# Patient Record
Sex: MALE | Race: WHITE | NOT HISPANIC OR LATINO | Employment: OTHER | URBAN - METROPOLITAN AREA
[De-identification: names, ages, dates, MRNs, and addresses within clinical notes are randomized per-mention and may not be internally consistent; named-entity substitution may affect disease eponyms.]

---

## 2022-01-01 ENCOUNTER — VIRTUAL VISIT (OUTPATIENT)
Dept: ONCOLOGY | Facility: CLINIC | Age: 77
End: 2022-01-01
Attending: STUDENT IN AN ORGANIZED HEALTH CARE EDUCATION/TRAINING PROGRAM
Payer: COMMERCIAL

## 2022-01-01 ENCOUNTER — PRE VISIT (OUTPATIENT)
Dept: ONCOLOGY | Facility: CLINIC | Age: 77
End: 2022-01-01

## 2022-01-01 ENCOUNTER — LAB REQUISITION (OUTPATIENT)
Dept: LAB | Facility: CLINIC | Age: 77
End: 2022-01-01
Payer: COMMERCIAL

## 2022-01-01 ENCOUNTER — PATIENT OUTREACH (OUTPATIENT)
Dept: ONCOLOGY | Facility: CLINIC | Age: 77
End: 2022-01-01

## 2022-01-01 DIAGNOSIS — Z19.2 HORMONE RESISTANT PROSTATE CANCER (H): ICD-10-CM

## 2022-01-01 DIAGNOSIS — C61 PROSTATE CANCER (H): ICD-10-CM

## 2022-01-01 DIAGNOSIS — C61 HORMONE RESISTANT PROSTATE CANCER (H): ICD-10-CM

## 2022-01-01 DIAGNOSIS — T45.1X5A CHEMOTHERAPY INDUCED NEUTROPENIA (H): Primary | ICD-10-CM

## 2022-01-01 DIAGNOSIS — D70.1 CHEMOTHERAPY INDUCED NEUTROPENIA (H): Primary | ICD-10-CM

## 2022-01-01 LAB
PATH REPORT.COMMENTS IMP SPEC: ABNORMAL
PATH REPORT.COMMENTS IMP SPEC: ABNORMAL
PATH REPORT.COMMENTS IMP SPEC: YES
PATH REPORT.FINAL DX SPEC: ABNORMAL
PATH REPORT.GROSS SPEC: ABNORMAL
PATH REPORT.MICROSCOPIC SPEC OTHER STN: ABNORMAL
PATH REPORT.RELEVANT HX SPEC: ABNORMAL
PATH REPORT.RELEVANT HX SPEC: ABNORMAL
PATH REPORT.SITE OF ORIGIN SPEC: ABNORMAL

## 2022-01-01 PROCEDURE — 88321 CONSLTJ&REPRT SLD PREP ELSWR: CPT | Performed by: PATHOLOGY

## 2022-01-01 PROCEDURE — 99205 OFFICE O/P NEW HI 60 MIN: CPT | Mod: 95 | Performed by: STUDENT IN AN ORGANIZED HEALTH CARE EDUCATION/TRAINING PROGRAM

## 2022-01-01 PROCEDURE — 99417 PROLNG OP E/M EACH 15 MIN: CPT | Mod: 95 | Performed by: STUDENT IN AN ORGANIZED HEALTH CARE EDUCATION/TRAINING PROGRAM

## 2022-01-01 RX ORDER — PROCHLORPERAZINE MALEATE 5 MG
5 TABLET ORAL EVERY 6 HOURS PRN
Status: CANCELLED
Start: 2023-01-01

## 2022-01-01 RX ORDER — AMLODIPINE BESYLATE AND ATORVASTATIN CALCIUM 5; 10 MG/1; MG/1
TABLET, FILM COATED ORAL
COMMUNITY

## 2022-01-01 RX ORDER — ALBUTEROL SULFATE 90 UG/1
1-2 AEROSOL, METERED RESPIRATORY (INHALATION)
Status: CANCELLED
Start: 2023-01-01

## 2022-01-01 RX ORDER — AMLODIPINE BESYLATE 5 MG/1
1 TABLET ORAL DAILY
COMMUNITY
Start: 2022-10-11

## 2022-01-01 RX ORDER — NITROGLYCERIN 0.4 MG/1
TABLET SUBLINGUAL
COMMUNITY
Start: 2022-10-11

## 2022-01-01 RX ORDER — ONDANSETRON 8 MG/1
8 TABLET, FILM COATED ORAL
Status: CANCELLED | OUTPATIENT
Start: 2023-01-01

## 2022-01-01 RX ORDER — LORAZEPAM 2 MG/ML
.5-1 INJECTION INTRAMUSCULAR EVERY 6 HOURS PRN
Status: CANCELLED | OUTPATIENT
Start: 2023-01-01

## 2022-01-01 RX ORDER — METHYLPREDNISOLONE SODIUM SUCCINATE 125 MG/2ML
125 INJECTION, POWDER, LYOPHILIZED, FOR SOLUTION INTRAMUSCULAR; INTRAVENOUS
Status: CANCELLED
Start: 2023-01-01

## 2022-01-01 RX ORDER — MEPERIDINE HYDROCHLORIDE 25 MG/ML
25 INJECTION INTRAMUSCULAR; INTRAVENOUS; SUBCUTANEOUS EVERY 30 MIN PRN
Status: CANCELLED | OUTPATIENT
Start: 2023-01-01

## 2022-01-01 RX ORDER — ATORVASTATIN CALCIUM 80 MG/1
1 TABLET, FILM COATED ORAL DAILY
COMMUNITY
Start: 2022-10-11

## 2022-01-01 RX ORDER — PREDNISONE 10 MG/1
10 TABLET ORAL
COMMUNITY
Start: 2022-01-01 | End: 2022-01-01

## 2022-01-01 RX ORDER — LORAZEPAM 0.5 MG/1
.5-1 TABLET ORAL EVERY 6 HOURS PRN
Status: CANCELLED
Start: 2023-01-01

## 2022-01-01 RX ORDER — ALBUTEROL SULFATE 0.83 MG/ML
2.5 SOLUTION RESPIRATORY (INHALATION)
Status: CANCELLED | OUTPATIENT
Start: 2023-01-01

## 2022-01-01 RX ORDER — EPINEPHRINE 1 MG/ML
0.3 INJECTION, SOLUTION INTRAMUSCULAR; SUBCUTANEOUS EVERY 5 MIN PRN
Status: CANCELLED | OUTPATIENT
Start: 2023-01-01

## 2022-01-01 RX ORDER — DIPHENHYDRAMINE HYDROCHLORIDE 50 MG/ML
50 INJECTION INTRAMUSCULAR; INTRAVENOUS
Status: CANCELLED
Start: 2023-01-01

## 2022-09-30 ENCOUNTER — TRANSCRIBE ORDERS (OUTPATIENT)
Dept: ONCOLOGY | Facility: CLINIC | Age: 77
End: 2022-09-30

## 2022-09-30 ENCOUNTER — PATIENT OUTREACH (OUTPATIENT)
Dept: ONCOLOGY | Facility: CLINIC | Age: 77
End: 2022-09-30

## 2022-09-30 DIAGNOSIS — C61 PROSTATE CANCER (H): Primary | ICD-10-CM

## 2022-09-30 NOTE — PROGRESS NOTES
New Patient Oncology Nurse Navigator Note     Referring provider:   Stevo Clarke MD, Oncologist  [facility] [place], [place]       Referred to (specialty): Medical Oncology    Requested provider (if applicable): Dr. Aggarwal     Date Referral Received: 9/30/22     Evaluation for : Metastatic castrate resistant prostate cancer- discussion of PSMA Lutetium/Pluvicto- See Dr. Aggarwal     Clinical History (per Nurse review of records provided):      Consideration of whether he may be a candidate for PSMA lutetium.  Mr. Davis has been through a number of treatments in the castrate resistant setting including abiraterone and prednisone, olaparib (he did have a CDK12 mutation on next generation sequencing), docetaxel, and now cabazitaxel. Follow of his disease predominantly via CT scanning for which he has had mediastinal adenopathy and pleural-based disease.  A biopsy of a pleural-based mass in 2020 did reveal metastatic prostate adenocarcinoma.  All of his clinical information is in epic and available via care everywhere.  His functional status is very good and remains that of ECOG 0-1. Not yet performed a PSMA PET scan.        Records Location (Care Everywhere, [place], etc.):   [facility]-       Records Needed:   [facility]- Imaging/pathology     Additional testing needed prior to consult: PSMA PET to be done locally prior to consult    I contacted Ravinder to discuss referral to Dr. Aggarwal for discussion of Pluvicto.  Ravinder would like to have his PSMA PET done locally prior to consult with Dr. Aggarwal.  Referral was initiated via email from Dr. Clarke to Dr. Aggarwal.  I have requested that Dr. Aggarwal ask Dr. Clarke, to order PET.  I have given Ravinder my phone number to call, once the PET is scheduled, and we will coordinate when he should see Dr. Aggarwal.  I will have our medical records team obtain imaging from [facility].  I will await call back from Ravinder to proceed.    10/18/22  I called  Ravinder today to check if he had set up for the PSMA PET.  He indicates it is tomorrow, but at this time he would like to first talk with Dr. Clarke regarding the results and call me if he wished to pursue.  I gave him my number again so he has it for certain.  I will await his call back and update Dr. Clarke and Dr. Aggarwal, as this originated from a referral by email from Dr. Clarke.

## 2022-10-03 ENCOUNTER — PRE VISIT (OUTPATIENT)
Dept: ONCOLOGY | Facility: CLINIC | Age: 77
End: 2022-10-03

## 2022-10-03 NOTE — TELEPHONE ENCOUNTER
Action October 5, 2022 10:39 AM ABT   Action Taken Images from [facility] received and resolved to PAC     RECORDS STATUS - ALL OTHER DIAGNOSIS      RECORDS RECEIVED FROM: [facility]   DATE RECEIVED:    NOTES STATUS DETAILS   OFFICE NOTE from referring provider [facility] Dr. Timothy Clarke   OFFICE NOTE from medical oncologist [facility] Dr. Clarke: 9/15/22   DISCHARGE SUMMARY from hospital [facility] 8/8/01   DISCHARGE REPORT from the ER     OPERATIVE REPORT [facility] 8/8/01: Radical retropubic prostatectomy with bilateral pelvic lymphadenectomy     MEDICATION LIST [facility]   LABS     PATHOLOGY REPORTS [facility], Reports in  9/17/20: [id number]  8/8/01: [id number]  6/8/01: [id number]   ANYTHING RELATED TO DIAGNOSIS Epic/  9/15/22   GENONOMIC TESTING     TYPE: [facility] 9/17/20: PDL-1   IMAGING (NEED IMAGES & REPORT)     CT SCANS PACS 7/28/22, 3/8/22, 1/3/22, 10/19/21, 8/27/20, 10/4/19, 4/5/19, 5/22/09: CT CAP   NM Bone Scan PACS 10/4/19, 4/5/19, 5/22/09   ULTRASOUND PACS 10/29/21: [facility]   PET PACS 11/15/19: [facility] (Report in [facility])

## 2022-11-18 NOTE — PROGRESS NOTES
I called in attempt to reach out to Kingston to discuss referral to see Dr. Aggarwal, for discussion of Pluvicto treatment.  There was no answer so I left message for him to return my call.      It appears per visit with Dr. Clarke, on 11/17/22, he would still like Kingston to move forward with consult.  I have a current hold on an appointment with Dr. Aggarwal, on December 12, 2:15 pm arrival, at the [facility].  I will await call back to discuss.

## 2022-11-21 NOTE — PROGRESS NOTES
COPIED FROM PREVIOUS NOTES:  9/30/22    New Patient Oncology Nurse Navigator Note     Referring provider:   Stevo Clarke MD, Oncologist  [place], [place]        Referred to (specialty): Medical Oncology     Requested provider (if applicable): Dr. Aggarwal     Date Referral Received: 9/30/22     Evaluation for : Metastatic castrate resistant prostate cancer- discussion of PSMA Lutetium/Pluvicto- See Dr. Aggarwal     Clinical History (per Nurse review of records provided):       Consideration of whether he may be a candidate for PSMA lutetium.  Mr. Davis has been through a number of treatments in the castrate resistant setting including abiraterone and prednisone, olaparib (he did have a CDK12 mutation on next generation sequencing), docetaxel, and now cabazitaxel. Follow of his disease predominantly via CT scanning for which he has had mediastinal adenopathy and pleural-based disease.  A biopsy of a pleural-based mass in 2020 did reveal metastatic prostate adenocarcinoma.  All of his clinical information is in epic and available via care everywhere.  His functional status is very good and remains that of ECOG 0-1. Not yet performed a PSMA PET scan.         Records Location (Care Everywhere, Media, etc.):   [facility]-       Records Needed:   [facility]- Imaging/pathology     Additional testing needed prior to consult: PSMA PET to be done locally prior to consult     I contacted Ravinder to discuss referral to Dr. Aggarwal for discussion of Pluvicto.  Ravinder would like to have his PSMA PET done locally prior to consult with Dr. Aggarwal.  Referral was initiated via email from Dr. Clarke to Dr. Aggarwal.  I have requested that Dr. Aggarwal ask Dr. Clarke, to order PET.  I have given Ravinder my phone number to call, once the PET is scheduled, and we will coordinate when he should see Dr. Aggarwal.  I will have our medical records team obtain imaging from [facility].  I will await call back  from Ravinder to carly.     10/18/22  I called Ravinder today to check if he had set up for the PSMA PET.  He indicates it is tomorrow, but at this time he would like to first talk with Dr. Clarke regarding the results and call me if he wished to pursue.  I gave him my number again so he has it for certain.  I will await his call back and update Dr. Clarke and Dr. Aggarwal, as this originated from a referral by email from Dr. Clarke.                  11/18/22  I called in attempt to reach out to Kingston to discuss referral to see Dr. Aggarwal, for discussion of Pluvicto treatment.  There was no answer so I left message for him to return my call.       It appears per visit with Dr. Clarke, on 11/17/22, he would still like Kingston to move forward with consult.  I have a current hold on an appointment with Dr. Aggarwal, on December 12, 2:15 pm arrival, at the [facility].  I will await call back to discuss.    11/21/22  Kingston received my message and left me one to call him back.  I called to discuss the above with Kingston.  He indicates he has another appointment on 12/12, but is able to do 12/26/22.  I reviewed with him what to expect at this visit.  I gave him the location and contact phone numbers.  I sent referral with scheduling instructions for 12/26/22, @ [facility], with Dr. Aggarwal, [id number] PM, in person.  He has no other questions at this time.  I transferred call to our new patient scheduling to finalize.    11/21/22 1540  Dr. Aggarwal is able to open up a spot for [place], on 12/13/22.  Plan that he will arrive at 1:45 PM.  I discussed this with Kingston and will have our new patient scheduling make this change.  He has no other needs at this time and thanked me for the call.

## 2022-11-30 NOTE — TELEPHONE ENCOUNTER
Action 2022 8:58 AM ABT   Action Taken Slides from [facility] received and taken to 5th floor path lab for review.       RECORDS STATUS - ALL OTHER DIAGNOSIS      RECORDS RECEIVED FROM: [facility]   DATE RECEIVED:    NOTES STATUS DETAILS   OFFICE NOTE from referring provider [facility] 22: Dr. Stevo Clarke   OFFICE NOTE from medical oncologist [facility] 22: Dr. Stevo Clarke   DISCHARGE SUMMARY from hospital [facility] 01: [facility]   OPERATIVE REPORT [facility] 01: Radical retropubic prostatectomy with bilateral pelvic lymphadenectomy   MEDICATION LIST [facility]    LABS     PATHOLOGY REPORTS Req -[facility]  [employer] F[facility]:  20: [id number]    Reports only:  01: [id number]  01: [id number]   ANYTHING RELATED TO DIAGNOSIS [facility] Most recent 22   GENONOMIC TESTING     TYPE: [facility] ([facility]) 20: [id number]   IMAGING (NEED IMAGES & REPORT)     CT SCANS PACS [facility]:  22: CT Urogram  10/11/22-21: CT Chest Abd Pel    PACS;  22-09   NM PACS 10/04/19-09: NM Bone Scan   PET PACS [facility]:   10/19/22: PET CT Skull

## 2022-12-13 PROBLEM — C61 PROSTATE CANCER (H): Status: ACTIVE | Noted: 2022-01-01

## 2022-12-13 PROBLEM — C61 HORMONE RESISTANT PROSTATE CANCER (H): Status: ACTIVE | Noted: 2022-01-01

## 2022-12-13 PROBLEM — D70.1 CHEMOTHERAPY INDUCED NEUTROPENIA (H): Status: ACTIVE | Noted: 2022-01-01

## 2022-12-13 PROBLEM — T45.1X5A CHEMOTHERAPY INDUCED NEUTROPENIA (H): Status: ACTIVE | Noted: 2022-01-01

## 2022-12-13 PROBLEM — Z19.2 HORMONE RESISTANT PROSTATE CANCER (H): Status: ACTIVE | Noted: 2022-01-01

## 2022-12-13 NOTE — PROGRESS NOTES
[facility] Medical Oncology Second Opinion Consultation Note       Date of visit: December 13, 2022      Dear Dr. Timothy Clarke, thank you for referring your patient for a Second Opinion consultation at the [facility].  A brief overview of his oncological history as well as my recommendations follow below.    CC: Consideration for Pluvicto therapy for mCRPC    HPI: Ravinder presents by video visit today to discuss the possibility of Pluvicto therapy for his metastatic castration resistant prostate cancer.  He is overall feeling well.  He denies significant pain.  He is able to chop wood with a log splitter and his chainsaw and he is able to clear snow without significant limitations.  Overall he is doing well.  His appetite is been good he does feel that cabazitaxel has somewhat limited his activity in the immediate days after infusion.  He usually recovers within 4 to 5 days after each cabazitaxel infusion.  He has no shortness of breath he reports.  He otherwise feels he is doing quite well given how long he has been treated for prostate cancer.  He denies additional complaints or concerns at the end of our appointment.    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    5/14/2001-elevated PSA to 40.   6/8/2001-transrectal biopsy.  [place] 4+3=7 bilaterally in 7/7 cores.  Age 55.   8/8/2001-Radical prostatectomy.  Bill 4+3=7 with tertiary 5.  Right SV involved with EPE.   10/2001-PSA 3.8  1/2002-PSA 5.7  1/8/2002-Start ADT x1 year and salvage XRT.    12/2002-11/2004-PSA [place] 0.01  5/2009-PSA 0.83.  Staging CT CAP and NM bone scan negative.    11/2009-PSA 4.6  3/2010-PSA 9.6  4/20102078-9363-MYD PSA elmer 0.05  2/2013-PSA 0.85  4/2013-PSA 2.6  6/2013-9.4  6/2013-Restart ADT, PSA elmer 0.16  5/27/2015-Addition of casodex through summer 2016  9/22/16-PSA 5.2 with doubling time of <3 months  10/7/2016-start  enzalutamide.  Elmer 0.62 on 4/16/18.   12/20/2019-Stop enzalutamide.  PSA 24.89  12/27/2019-Start abiraterone/prednisone.   8/27/20-PSA 39.01.  No response to abiraterone.  9/17/20-FNA of R pleural mass consistent with metastatic, poorly differentiated prostate adenocarcinoma.   10/2020-Stop abiraterone. Start olaparib.   9/7/2021-.24.  No response to olaparib.    10/2021-Stop olaparib. Start docetaxel x6 cycles through 7/28/22. PSA elmer 120 after 4 cycles.   8/3/22- Start cabazitaxel.  .9  8/25/22-.84  9/15/22-.6  10/6/22-.8  12/8/22-.9  12/13/22-Initial medical oncology appointment at [facility] to discuss Pluvicto.      GENOMIC STUDIES:   [facility] testing 9/2020 from pleural mass.   MMR intact   PD-L1 <1%      TREATMENT HISTORY:   RPLND 2001  Salvage XRT 2002  ADT x1 year 2002; 4/20100966-3595; 6/2013-present   Casodex 5/2015-6/2016  Enzalutamide 10/2016-12/2019  Abiraterone/pred 12/2019-10/2020  olaparib 10/2020-9/2021  Docetaxel 10/2021-7/2022  cabazitaxel 8/2022-present     GUIDELINES USED: NCCN    INTENT OF THERAPY: Palliative.  Discussed at 12/13/22 appointment.      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    No family history of prostate, breast, or pancreatic cancer.    Son with oral cancer at age 44.      CVD in mother, sister and maternal aunt  DM in father     SOCIAL:  Smoking-short time in college only   Smokeless tobacco-never user  EtOH-3 glasses of wine per week  Recreational drugs-none  Herbs/supplements-none     ALLERGIES: NKDA    MEDS:  Multivitamin daily   Aspirin 81mg daily  Vitamin D 2000 units daily  Lupron  3 month dose  Amlodipine 5mg daily   Atorvastatin 80mg daily   Prednisone 10mg daily (with cabazitaxel)  Ibuprofen PRN    ROS-Remainder of 14 point ROS reviewed and negative except as in HPI.      PHYSICAL EXAM:  ECOG-PS=1    There were no vitals taken for this visit.  Exam:   GEN: appears stated age, NAD  HEENT: MMM, anicteric sclerae  RESP: normal WOB on RA, no cough, wheezing or rhonchi   MSK: normal bulk   SKIN: no lesions, rashes or wounds on exposed areas of skin   NEURO: CNII-XII grossly intact, normal speech, moving bilateral UE symmetrically and spontaneously   PSYCH: normal affect and tone, good historian.      The rest of a comprehensive physical examination is deferred  due to PHE (public health emergency) video visit restrictions.       LABS AND IMAGES:    PSMA PET from [facility] 10/19/22 personally reviewed.    Marked PSMA avidity in known lung nodules, which were previously biopsied and demonstrated to be prostate adenocarcinoma.  No SUV max available.  Otherwise physiologic uptake in salivary/lacrimal glands, ileum, and kidneys.      Labs [facility] 12/8/22  CBC: WBC 14.1 (on growth factor for cabazitaxel); Hgb 11.3, Plt 347, ANC 13.1  CMP: Lytes WNL,  Creatinine 0.86, LFT and bilirubin WNL     PSA trend, see oncology history     IMPRESSION AND PLAN:    # metastatic castration resistant prostate cancer.      Ravinder presents today for initial discussions regarding Pluvicto therapy for his metastatic castration resistant prostate cancer which has been treated with docetaxel, cabazitaxel, enzalutamide and abiraterone.  He is additionally been treated with olaparib.  He has had good results with cabazitaxel with his PSA declining from approximately 500 to the mid 200s after several months of therapy.  His PSA continues to decline.  He was initially diagnosed with Bill 4+3 equal 7 with tertiary 5 features in 2001 and then subsequently underwent radical prostatectomy with lymph node  dissection.  Shortly after he underwent salvage radiation therapy with 1 year of androgen deprivation therapy he then had a significant period of time of approximately 7 years without any treatment.  He has subsequently been retreated with androgen deprivation therapy and other approved therapies as noted above.  He is overall doing well at the time of our initial visit.  His ECOG performance status is 1.  He has no pain at this time.  He remains relatively active and is able to chop wood and clear snow without issues.  He is interested in proceeding with Pluvicto and has several questions throughout our conversation regarding the therapy.    We discussed the rationale and mechanism for Pluvicto therapy in particular that it is a radioligand therapy consisting of lutetium 177 and a PSMA binding domain.  We discussed that Pluvicto functions in a targeted fashion by bringing the lutetium 177 radioactive particle in proximity to PSMA expressing prostate cancer cells subsequently killing them due to radiation damage.  We discussed that there is potential on target off organ effects which particularly affect the salivary glands, lacrimal glands.  We discussed that in particular dry mouth is a significant side effect which is progressive often noted at the third or later cycles.  While we have only recently had Pluvicto available in [place], we do have data from [place] in [facility] which have had Pluvicto available for several years.  In [place] Pluvicto was approved via the VISION trial and demonstrated an overall survival and progression free survival benefit in patients treated with Pluvicto.  Approximately 50% of patients had a PSA decline while the remaining patients either have a stable PSA or a PSA continues to rise throughout therapy.  In order to qualify for Pluvicto patients must have received at least docetaxel and 1 novel androgen receptor targeting agent such as enzalutamide.  They additionally  must have PSMA avidity noted on PSMA PET scan.  Ravinder qualifies based on all of these criteria.  Additionally baseline blood counts are required which he also meets.    We discussed the logistics of Pluvicto administration which is typically given through an IV, even though he has a port.  This is given every 6 weeks for a total of up to 6 doses.  During the time immediately after Pluvicto infusion patients must remain only 6 feet away from family members for 3 days and away from children or pregnant women for at least 14 days.  This due to radioactive decay and shedding of the radioactive particles in this interval.  We discussed that other significant side effects besides dry mouth include nausea and vomiting and diarrhea.  We discussed that cytopenias which may be prolonged are also possible hence the requirement for baseline blood counts prior to each dose of therapy.  We also discussed that in rare cases 1% or less that bone marrow failure can occur.  We discussed that transfusion would be required in the case like this however would not be curative and the immune system cannot be reconstituted in bone marrow failure states due to lack of leukocytes.      Finally we discussed the logistics of receiving Pluvicto in the winter in [place].  Ravinder lives in the proximity of [place] and is aware that he will need to drive to the [facility] for each of these therapies.  We discussed that therapy typically occurs in nuclear medicine, in particular that this therapy must occur on the date is scheduled given the degradation of the product.  We discussed that he must get labs with least 1 week prior to ordering of the dose as this comes from either [place] or [place].  We discussed that the dose cannot be delayed like conventional chemotherapy and if there is bad weather coming that he must attempt to come to the [facility] prior to that weather in order to receive his therapy.  He is in agreement with this and does  not believe getting to the [facility] to receive his doses will be an issue.  He is interested in pursuing Pluvicto therapy.  And would like us to submit a prior authorization to start treatment.  We did discuss that is approximately 8 to 12-week delay currently due to supply chain constraints with Pluvicto.  I would recommend that he continue with cabazitaxel at this time as he is continuing to respond however this may not be the case in 8 to 12 weeks once we are able to obtain the first dose for him.  I will then notify him and his referring physician, Dr. Clarke, once the dosing dates have been secured along with insurance approval.  In this case Dr. Clarke orders labs locally at [facility], which we can access.  If this is not possible we are happy to send orders to .    Ravinder and his wife denied additional questions or concerns at the end of our conversation and would like to proceed.    PLAN:   1. I will submit the prior authorization request for your insurance today.  It typically takes about 2 weeks to get approval.    2. I will let you know once we get approval.    3. I will let Dr. Clarke know I met you and think you are a good candidate for Pluvicto as well.   4. I will see you 2/20/23 at 12:30 at the clinic in [place].      Here are some information links for Pluvicto:   https://www.us.pluvicto.com/about-pluvicto/what-to-expect-when-taking-pluvicto/  https://www.hcp.novartis.com/siteassets/heidy/resources/233932-pluvicto-patient-brochure-pdf-updated-8.22.pdf  https://www.drugs.com/cons/pluvicto.html    Please add [phone] and [phone] to your phone book.  Most calls from me will come from these numbers.      A total of 100 minutes were spent on this patient on the day of the encounter, of which more than 50% of this time was used for counseling and coordination of care.  The patient and his wife were given the opportunity to ask multiple questions today, all of which  were answered to their satisfaction.  Extended service time today was necessary for coordination of his care with his local oncologist, starting the process for prior authorization, and review of outside records and documentation.    Davis Aggarwal MD, PhD   of Medicine   Oncology/BMT/Cellular Therapies

## 2022-12-13 NOTE — LETTER
12/13/2022         RE: Ravinder Jaramillo  [address]  [facility] [address]        Dear Colleague,    Thank you for referring your patient, Ravinder Jaramillo, to the [facility]. Please see a copy of my visit note below.    Ravinder is a 77 year old who is being evaluated via a billable video visit.      How would you like to obtain your AVS? MyChart  If the video visit is dropped, the invitation should be resent by: Text to cell phone: [phone]  Will anyone else be joining your video visit? Zoey Gamble         Video-Visit Details    Video Start Time: 1:58    Type of service:  Video Visit    Video End Time:2:44    Originating Location (pt. Location): Home        Distant Location (provider location):  On-site    Platform used for Video Visit: [facility] Medical Oncology Second Opinion Consultation Note       Date of visit: December 13, 2022      Dear Dr. Timothy Clarke, thank you for referring your patient for a Second Opinion consultation at the [facility].  A brief overview of his oncological history as well as my recommendations follow below.    CC: Consideration for Pluvicto therapy for mCRPC    HPI: Ravinder presents by video visit today to discuss the possibility of Pluvicto therapy for his metastatic castration resistant prostate cancer.  He is overall feeling well.  He denies significant pain.  He is able to chop wood with a log splitter and his chainsaw and he is able to clear snow without significant limitations.  Overall he is doing well.  His appetite is been good he does feel that cabazitaxel has somewhat limited his activity in the immediate days after infusion.  He usually recovers within 4 to 5 days after each cabazitaxel infusion.  He has no shortness of breath he reports.  He otherwise feels he is doing quite well given how long he has been treated for prostate cancer.  He denies additional complaints or  concerns at the end of our appointment.    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    5/14/2001-elevated PSA to 40.   6/8/2001-transrectal biopsy.  Lincoln 4+3=7 bilaterally in 7/7 cores.  Age 55.   8/8/2001-Radical prostatectomy.  Bill 4+3=7 with tertiary 5.  Right SV involved with EPE.   10/2001-PSA 3.8  1/2002-PSA 5.7  1/8/2002-Start ADT x1 year and salvage XRT.    12/2002-11/2004-PSA Elmer 0.01  5/2009-PSA 0.83.  Staging CT CAP and NM bone scan negative.    11/2009-PSA 4.6  3/2010-PSA 9.6  4/20107712-4754-ZLK PSA elmer 0.05  2/2013-PSA 0.85  4/2013-PSA 2.6  6/2013-9.4  6/2013-Restart ADT, PSA elmer 0.16  5/27/2015-Addition of casodex through summer 2016  9/22/16-PSA 5.2 with doubling time of <3 months  10/7/2016-start enzalutamide.  Elmer 0.62 on 4/16/18.   12/20/2019-Stop enzalutamide.  PSA 24.89  12/27/2019-Start abiraterone/prednisone.   8/27/20-PSA 39.01.  No response to abiraterone.  9/17/20-FNA of R pleural mass consistent with metastatic, poorly differentiated prostate adenocarcinoma.   10/2020-Stop abiraterone. Start olaparib.   9/7/2021-.24.  No response to olaparib.    10/2021-Stop olaparib. Start docetaxel x6 cycles through 7/28/22. PSA elmer 120 after 4 cycles.   8/3/22- Start cabazitaxel.  .9  8/25/22-.84  9/15/22-.6  10/6/22-.8  12/8/22-.9  12/13/22-Initial medical oncology appointment at [facility] to discuss Pluvicto.      GENOMIC STUDIES:   Tempus testing 9/2020 from pleural mass.   MMR intact   PD-L1 <1%      TREATMENT HISTORY:   RPLND 2001  Salvage XRT 2002  ADT x1 year 2002; 4/20103540-5343; 6/2013-present   Casodex 5/2015-6/2016  Enzalutamide 10/2016-12/2019  Abiraterone/pred 12/2019-10/2020  olaparib 10/2020-9/2021  Docetaxel 10/2021-7/2022  cabazitaxel 8/2022-present     GUIDELINES USED: NCCN    INTENT OF THERAPY: Palliative.  Discussed at 12/13/22 appointment.      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    No family history of prostate, breast, or pancreatic cancer.    Son with oral cancer at age 44.      CVD in mother, sister and maternal aunt  DM in father     SOCIAL:  Smoking-short time in college only   Smokeless tobacco-never user  EtOH-3 glasses of wine per week  Recreational drugs-none  Herbs/supplements-none     ALLERGIES: NKDA    MEDS:  Multivitamin daily   Aspirin 81mg daily  Vitamin D 2000 units daily  Lupron 3 month dose  Amlodipine 5mg daily   Atorvastatin 80mg daily   Prednisone 10mg daily (with cabazitaxel)  Ibuprofen PRN    ROS-Remainder of 14 point ROS reviewed and negative except as in HPI.      PHYSICAL EXAM:  ECOG-PS=1    There were no vitals taken for this visit.  Exam:   GEN: appears stated age, NAD  HEENT: MMM, anicteric sclerae  RESP: normal WOB on RA, no cough, wheezing or rhonchi   MSK: normal bulk   SKIN: no lesions, rashes or wounds on exposed areas of skin   NEURO: CNII-XII grossly intact, normal speech, moving bilateral UE symmetrically and spontaneously   PSYCH: normal affect and tone, good historian.      The rest of a comprehensive physical examination is deferred  due to PHE (public health emergency) video visit restrictions.       LABS AND IMAGES:    PSMA PET from [facility] 10/19/22 personally reviewed.    Marked PSMA avidity in known lung nodules, which were previously biopsied and demonstrated to be prostate adenocarcinoma.  No SUV max available.  Otherwise physiologic uptake in salivary/lacrimal glands, ileum, and kidneys.      Labs [facility] 12/8/22  CBC: WBC 14.1 (on growth factor for  cabazitaxel); Hgb 11.3, Plt 347, ANC 13.1  CMP: Lytes WNL,  Creatinine 0.86, LFT and bilirubin WNL     PSA trend, see oncology history     IMPRESSION AND PLAN:    # metastatic castration resistant prostate cancer.      Ravinder presents today for initial discussions regarding Pluvicto therapy for his metastatic castration resistant prostate cancer which has been treated with docetaxel, cabazitaxel, enzalutamide and abiraterone.  He is additionally been treated with olaparib.  He has had good results with cabazitaxel with his PSA declining from approximately 500 to the mid 200s after several months of therapy.  His PSA continues to decline.  He was initially diagnosed with Bill 4+3 equal 7 with tertiary 5 features in 2001 and then subsequently underwent radical prostatectomy with lymph node dissection.  Shortly after he underwent salvage radiation therapy with 1 year of androgen deprivation therapy he then had a significant period of time of approximately 7 years without any treatment.  He has subsequently been retreated with androgen deprivation therapy and other approved therapies as noted above.  He is overall doing well at the time of our initial visit.  His ECOG performance status is 1.  He has no pain at this time.  He remains relatively active and is able to chop wood and clear snow without issues.  He is interested in proceeding with Pluvicto and has several questions throughout our conversation regarding the therapy.    We discussed the rationale and mechanism for Pluvicto therapy in particular that it is a radioligand therapy consisting of lutetium 177 and a PSMA binding domain.  We discussed that Pluvicto functions in a targeted fashion by bringing the lutetium 177 radioactive particle in proximity to PSMA expressing prostate cancer cells subsequently killing them due to radiation damage.  We discussed that there is potential on target off organ effects which particularly affect the salivary glands,  lacrimal glands.  We discussed that in particular dry mouth is a significant side effect which is progressive often noted at the third or later cycles.  While we have only recently had Pluvicto available in [place], we do have data from [place] in [place] which have had Pluvicto available for several years.  In [place] Pluvicto was approved via the VISION trial and demonstrated an overall survival and progression free survival benefit in patients treated with Pluvicto.  Approximately 50% of patients had a PSA decline while the remaining patients either have a stable PSA or a PSA continues to rise throughout therapy.  In order to qualify for Pluvicto patients must have received at least docetaxel and 1 novel androgen receptor targeting agent such as enzalutamide.  They additionally must have PSMA avidity noted on PSMA PET scan.  Ravinder qualifies based on all of these criteria.  Additionally baseline blood counts are required which he also meets.    We discussed the logistics of Pluvicto administration which is typically given through an IV, even though he has a port.  This is given every 6 weeks for a total of up to 6 doses.  During the time immediately after Pluvicto infusion patients must remain only 6 feet away from family members for 3 days and away from children or pregnant women for at least 14 days.  This due to radioactive decay and shedding of the radioactive particles in this interval.  We discussed that other significant side effects besides dry mouth include nausea and vomiting and diarrhea.  We discussed that cytopenias which may be prolonged are also possible hence the requirement for baseline blood counts prior to each dose of therapy.  We also discussed that in rare cases 1% or less that bone marrow failure can occur.  We discussed that transfusion would be required in the case like this however would not be curative and the immune system cannot be reconstituted in bone marrow failure  [facility] due to lack of leukocytes.      Finally we discussed the logistics of receiving Pluvicto in the winter in [place].  Ravinder lives in the proximity of [place] and is aware that he will need to drive to the [facility] for each of these therapies.  We discussed that therapy typically occurs in nuclear medicine, in particular that this therapy must occur on the date is scheduled given the degradation of the product.  We discussed that he must get labs with least 1 week prior to ordering of the dose as this comes from either [place] or [place].  We discussed that the dose cannot be delayed like conventional chemotherapy and if there is bad weather coming that he must attempt to come to the [facility] prior to that weather in order to receive his therapy.  He is in agreement with this and does not believe getting to the [facility] to receive his doses will be an issue.  He is interested in pursuing Pluvicto therapy.  And would like us to submit a prior authorization to start treatment.  We did discuss that is approximately 8 to 12-week delay currently due to supply chain constraints with Pluvicto.  I would recommend that he continue with cabazitaxel at this time as he is continuing to respond however this may not be the case in 8 to 12 weeks once we are able to obtain the first dose for him.  I will then notify him and his referring physician, Dr. Clarke, once the dosing dates have been secured along with insurance approval.  In this case Dr. Clarke orders labs locally at [facility], which we can access.  If this is not possible we are happy to send orders to [facility].    Ravinder and his wife denied additional questions or concerns at the end of our conversation and would like to proceed.    PLAN:   1. I will submit the prior authorization request for your insurance today.  It typically takes about 2 weeks to get approval.    2. I will let you know once we get approval.    3. I will let Dr. Clarke  know I met you and think you are a good candidate for Pluvicto as well.   4. I will see you 2/20/23 at 12:30 at the clinic in [place].      Here are some information links for Pluvicto:   [email or web address]/about-pluvicto/what-to-expect-when-taking-pluvicto/  https://www.hcp.[email or web address]/siteassets/heidy/resources/233932-pluvicto-[email or web address]-pdf-updated-8.22.pdf  [email or web address]/[email or web address]    Please add [phone] and [phone] to your phone book.  Most calls from me will come from these numbers.      A total of 100 minutes were spent on this patient on the day of the encounter, of which more than 50% of this time was used for counseling and coordination of care.  The patient and his wife were given the opportunity to ask multiple questions today, all of which were answered to their satisfaction.  Extended service time today was necessary for coordination of his care with his local oncologist, starting the process for prior authorization, and review of outside records and documentation.    Davis Aggarwal MD, PhD   of Medicine   Oncology/BMT/Cellular Therapies

## 2022-12-13 NOTE — PATIENT INSTRUCTIONS
Ravinder,   It was a pleasure to meet you today.      I will submit the prior authorization request for your insurance today.  It typically takes about 2 weeks to get approval.    I will let you know once we get approval.    I will let Dr. Clarke know I met you and think you are a good candidate for Pluvicto as well.   I will see you 2/20/23 at 12:30 at the clinic in [place].      Here are some information links for Pluvicto:   [email or web address].[email or web address]/about-pluvicto/what-to-expect-when-taking-pluvicto/  https://www.hcp.[email or web address]/siteassets/vilupsa/resources/233932-pluvicto-[email or web address]-pdf-updated-8.22.pdf  [email or web address]/[email or web address]    Please add [phone] and [phone] to your phone book.  Most calls from me will come from these numbers.      Let me know if you have any other questions or concerns.     Dr. Aggarwal

## 2022-12-13 NOTE — PROGRESS NOTES
Ravinder is a 77 year old who is being evaluated via a billable video visit.      How would you like to obtain your AVS? MyChart  If the video visit is dropped, the invitation should be resent by: Text to cell phone: [phone]  Will anyone else be joining your video visit? Zoey Gamble         Video-Visit Details    Video Start Time: 1:58    Type of service:  Video Visit    Video End Time:2:44    Originating Location (pt. Location): Home        Distant Location (provider location):  On-site    Platform used for Video Visit: Emmanuelle

## 2023-01-01 ENCOUNTER — HEALTH MAINTENANCE LETTER (OUTPATIENT)
Age: 78
End: 2023-01-01